# Patient Record
Sex: MALE | Race: OTHER | HISPANIC OR LATINO | URBAN - METROPOLITAN AREA
[De-identification: names, ages, dates, MRNs, and addresses within clinical notes are randomized per-mention and may not be internally consistent; named-entity substitution may affect disease eponyms.]

---

## 2022-07-20 ENCOUNTER — EMERGENCY (EMERGENCY)
Facility: HOSPITAL | Age: 27
LOS: 1 days | Discharge: HOME | End: 2022-07-20
Attending: EMERGENCY MEDICINE
Payer: COMMERCIAL

## 2022-07-20 VITALS
WEIGHT: 141.98 LBS | RESPIRATION RATE: 19 BRPM | SYSTOLIC BLOOD PRESSURE: 111 MMHG | OXYGEN SATURATION: 98 % | HEART RATE: 78 BPM | DIASTOLIC BLOOD PRESSURE: 67 MMHG | TEMPERATURE: 98 F

## 2022-07-20 PROCEDURE — 99282 EMERGENCY DEPT VISIT SF MDM: CPT

## 2022-07-20 PROCEDURE — 99283 EMERGENCY DEPT VISIT LOW MDM: CPT

## 2022-07-20 NOTE — ED PROVIDER NOTE - CLINICAL SUMMARY MEDICAL DECISION MAKING FREE TEXT BOX
27 yr old male with no hx or fam hx presents to ed c/o paresthesia of right facial maxilla around 120p while outside walking. lasted less than 1 hr. no fever, no chills, no visual changes, no headache, no sob, no cough, no cp, no palpitations, no syncope, no abd pain, no n/v/d, no dysuria, no rashes, no ms, no toothache, trauma, no drug use, no alcohol use, no hx of aneurysm in family, no focal weakness, no polyuria, polydipsia    paresthesia. no sign of intracranial path, not consistent with infectious. no neurological deficit. no dm sx- dc if persist then see neuro- pt asx

## 2022-07-20 NOTE — ED ADULT TRIAGE NOTE - WEIGHT IN LBS
Pt unable to complete breathalyzer after 3 attempts because of his "emphazemia"        Bri Ge  01/17/21 1953 141.9

## 2022-07-20 NOTE — ED ADULT NURSE NOTE - CAS ELECT INFOMATION PROVIDED
pt evaluated, treated and discharged by Oumar Álvarez MD. No nursing intervention needed./DC instructions

## 2022-07-20 NOTE — ED PROVIDER NOTE - OBJECTIVE STATEMENT
27 yr old male with no hx or fam hx presents to ed c/o paresthesia of right facial maxilla around 120p while outside walking. lasted less than 1 hr. no fever, no chills, no visual changes, no headache, no sob, no cough, no cp, no palpitations, no syncope, no abd pain, no n/v/d, no dysuria, no rashes, no ms, no toothache, trauma, no drug use, no alcohol use, no hx of aneurysm in family, no focal weakness, no polyuria, polydipsia

## 2022-07-20 NOTE — ED PROVIDER NOTE - NEUROLOGICAL, MLM
Alert and oriented, no focal deficits, no motor or sensory deficits. Cn II-xii intact, no dysmetria, no ataxia.

## 2022-07-20 NOTE — ED PROVIDER NOTE - PATIENT PORTAL LINK FT
You can access the FollowMyHealth Patient Portal offered by [facility] by registering at the following website: [email or web address]. By joining [email or web address]’s FollowMyHealth portal, you will also be able to view your health information using other applications (apps) compatible with our system.

## 2024-02-11 NOTE — ED ADULT NURSE NOTE - BEFAST BALANCE
Problem: Discharge Planning  Goal: Discharge to home or other facility with appropriate resources  2/11/2024 1026 by Nu Rivas, RN  Outcome: Progressing    Flowsheets (Taken 2/10/2024 2219)  Discharge to home or other facility with appropriate resources: Identify barriers to discharge with patient and caregiver     Problem: Safety - Adult  Goal: Free from fall injury  2/11/2024 1026 by Nu Rivas, RN  Outcome: Progressing       Problem: Skin/Tissue Integrity  Goal: Absence of new skin breakdown  Description: 1.  Monitor for areas of redness and/or skin breakdown  2.  Assess vascular access sites hourly  3.  Every 4-6 hours minimum:  Change oxygen saturation probe site  4.  Every 4-6 hours:  If on nasal continuous positive airway pressure, respiratory therapy assess nares and determine need for appliance change or resting period.  2/11/2024 1026 by Nu Rivas, RN  Outcome: Progressing       Problem: Pain  Goal: Verbalizes/displays adequate comfort level or baseline comfort level  Outcome: Progressing     Problem: Chronic Conditions and Co-morbidities  Goal: Patient's chronic conditions and co-morbidity symptoms are monitored and maintained or improved  Outcome: Progressing      No